# Patient Record
Sex: MALE | Race: BLACK OR AFRICAN AMERICAN
[De-identification: names, ages, dates, MRNs, and addresses within clinical notes are randomized per-mention and may not be internally consistent; named-entity substitution may affect disease eponyms.]

---

## 2020-02-23 ENCOUNTER — HOSPITAL ENCOUNTER (INPATIENT)
Dept: HOSPITAL 54 - ER | Age: 50
LOS: 3 days | Discharge: NURSING, REHAB OR LONG-TERM CARE | DRG: 380 | End: 2020-02-26
Attending: NURSE PRACTITIONER | Admitting: NURSE PRACTITIONER
Payer: COMMERCIAL

## 2020-02-23 VITALS — BODY MASS INDEX: 35.94 KG/M2 | HEIGHT: 74 IN | WEIGHT: 280 LBS

## 2020-02-23 VITALS — SYSTOLIC BLOOD PRESSURE: 120 MMHG | DIASTOLIC BLOOD PRESSURE: 73 MMHG

## 2020-02-23 VITALS — DIASTOLIC BLOOD PRESSURE: 73 MMHG | SYSTOLIC BLOOD PRESSURE: 120 MMHG

## 2020-02-23 VITALS — SYSTOLIC BLOOD PRESSURE: 132 MMHG | DIASTOLIC BLOOD PRESSURE: 76 MMHG

## 2020-02-23 VITALS — SYSTOLIC BLOOD PRESSURE: 121 MMHG | DIASTOLIC BLOOD PRESSURE: 71 MMHG

## 2020-02-23 VITALS — SYSTOLIC BLOOD PRESSURE: 117 MMHG | DIASTOLIC BLOOD PRESSURE: 71 MMHG

## 2020-02-23 VITALS — SYSTOLIC BLOOD PRESSURE: 111 MMHG | DIASTOLIC BLOOD PRESSURE: 76 MMHG

## 2020-02-23 VITALS — DIASTOLIC BLOOD PRESSURE: 76 MMHG | SYSTOLIC BLOOD PRESSURE: 132 MMHG

## 2020-02-23 DIAGNOSIS — L89.154: ICD-10-CM

## 2020-02-23 DIAGNOSIS — E11.40: ICD-10-CM

## 2020-02-23 DIAGNOSIS — E11.69: ICD-10-CM

## 2020-02-23 DIAGNOSIS — L97.519: ICD-10-CM

## 2020-02-23 DIAGNOSIS — E78.5: ICD-10-CM

## 2020-02-23 DIAGNOSIS — E11.621: Primary | ICD-10-CM

## 2020-02-23 DIAGNOSIS — L97.419: ICD-10-CM

## 2020-02-23 DIAGNOSIS — D68.69: ICD-10-CM

## 2020-02-23 DIAGNOSIS — Z74.01: ICD-10-CM

## 2020-02-23 DIAGNOSIS — E66.01: ICD-10-CM

## 2020-02-23 DIAGNOSIS — G82.50: ICD-10-CM

## 2020-02-23 DIAGNOSIS — I10: ICD-10-CM

## 2020-02-23 DIAGNOSIS — K92.2: ICD-10-CM

## 2020-02-23 DIAGNOSIS — Z89.512: ICD-10-CM

## 2020-02-23 DIAGNOSIS — D63.8: ICD-10-CM

## 2020-02-23 DIAGNOSIS — D62: ICD-10-CM

## 2020-02-23 DIAGNOSIS — E87.6: ICD-10-CM

## 2020-02-23 DIAGNOSIS — Z79.01: ICD-10-CM

## 2020-02-23 DIAGNOSIS — N40.0: ICD-10-CM

## 2020-02-23 LAB
BASOPHILS # BLD AUTO: 0.1 /CMM (ref 0–0.2)
BASOPHILS NFR BLD AUTO: 1.1 % (ref 0–2)
BUN SERPL-MCNC: 9 MG/DL (ref 7–18)
CALCIUM SERPL-MCNC: 7.5 MG/DL (ref 8.5–10.1)
CHLORIDE SERPL-SCNC: 108 MMOL/L (ref 98–107)
CO2 SERPL-SCNC: 27 MMOL/L (ref 21–32)
CREAT SERPL-MCNC: 0.6 MG/DL (ref 0.6–1.3)
EOSINOPHIL NFR BLD AUTO: 3.2 % (ref 0–6)
EOSINOPHIL NFR BLD MANUAL: 2 % (ref 0–4)
GLUCOSE SERPL-MCNC: 178 MG/DL (ref 74–106)
HCT VFR BLD AUTO: 20 % (ref 39–51)
HGB BLD-MCNC: 6.4 G/DL (ref 13.5–17.5)
LYMPHOCYTES NFR BLD AUTO: 1.6 /CMM (ref 0.8–4.8)
LYMPHOCYTES NFR BLD AUTO: 24.9 % (ref 20–44)
LYMPHOCYTES NFR BLD MANUAL: 21 % (ref 16–48)
MCHC RBC AUTO-ENTMCNC: 32 G/DL (ref 31–36)
MCV RBC AUTO: 84 FL (ref 80–96)
MONOCYTES NFR BLD AUTO: 0.6 /CMM (ref 0.1–1.3)
MONOCYTES NFR BLD AUTO: 9.4 % (ref 2–12)
MONOCYTES NFR BLD MANUAL: 5 % (ref 0–11)
NEUTROPHILS # BLD AUTO: 4 /CMM (ref 1.8–8.9)
NEUTROPHILS NFR BLD AUTO: 61.4 % (ref 43–81)
NEUTS SEG NFR BLD MANUAL: 71 % (ref 42–76)
PLATELET # BLD AUTO: 434 /CMM (ref 150–450)
POTASSIUM SERPL-SCNC: 3.3 MMOL/L (ref 3.5–5.1)
RBC # BLD AUTO: 2.4 MIL/UL (ref 4.5–6)
SODIUM SERPL-SCNC: 142 MMOL/L (ref 136–145)
VARIANT LYMPHS NFR BLD MANUAL: 1 % (ref 0–0)
WBC NRBC COR # BLD AUTO: 6.6 K/UL (ref 4.3–11)

## 2020-02-23 PROCEDURE — A6403 STERILE GAUZE>16 <= 48 SQ IN: HCPCS

## 2020-02-23 PROCEDURE — A6253 ABSORPT DRG > 48 SQ IN W/O B: HCPCS

## 2020-02-23 PROCEDURE — G0378 HOSPITAL OBSERVATION PER HR: HCPCS

## 2020-02-23 RX ADMIN — Medication SCH EACH: at 22:00

## 2020-02-23 RX ADMIN — Medication SCH EACH: at 17:16

## 2020-02-23 RX ADMIN — ATORVASTATIN CALCIUM SCH MG: 40 TABLET, FILM COATED ORAL at 22:20

## 2020-02-23 RX ADMIN — SODIUM CHLORIDE PRN MLS/HR: 9 INJECTION, SOLUTION INTRAVENOUS at 21:21

## 2020-02-23 RX ADMIN — TAMSULOSIN HYDROCHLORIDE SCH MG: 0.4 CAPSULE ORAL at 22:20

## 2020-02-23 RX ADMIN — GABAPENTIN SCH MG: 300 CAPSULE ORAL at 22:20

## 2020-02-23 NOTE — NUR
rn notes



Patient had bowel movement removed from him.  About 6 handful of bowel movement removed.

## 2020-02-23 NOTE — NUR
BIB [facility] C/O BLEEDING DIABETIC FOOT ULCER, pt awake alert, -sob, nad 
noted. placed on monitor, vss, pending er provider eval

## 2020-02-23 NOTE — NUR
rn notes



patient refuses blood sugar check x3.  Explained the benefits of the procedure and still 
refused.

## 2020-02-23 NOTE — NUR
rn notes



Patient states he only wants chicken.  No red meat, or vegetables.  Patient was offered 
fruits with cottage cheese and refused.  Kitchen states that they do not have chicken and 
patient is on reduced salt diet.

## 2020-02-23 NOTE — NUR
MS RN NOTE 



NOTIFIED MD THAT THE PATIENT IS IN PAIN AND DOES NOT WANT TO TAKE PRN MEDICATION; TYLENOL 
650MG Q6 PRN NOR NORO5/325 AND IS REQUESTING NAPROXEN. MD DID NOT GIVE ANY ORDER AND STATED 
PATIENT SHOULD NOT BE TAKING NAPROXEN D/T DX OF ANEMIA. ALSO STATED SHE CAN CHANGE ORDER FOR 
TYLENOL 650MG ORDER TO Q4HR. INFORMED PATIENT AND HE STILL REFUSED NORCO AND TYLENOL. MD 
AWARE, NO NEW ORDERS, WILL CONTINUE TO MONITOR.

## 2020-02-23 NOTE — NUR
MS RN NOTES 



PATIENT REFUSED SCHEDULED 2200 ACCU CHECK. EXPLAINED RISKS AND BENEFITS. CONTINUED TO 
REFUSE. WILL CONTINUE TO MONITOR.

## 2020-02-23 NOTE — NUR
MS RN OPENING NOTE 



RECEIVED PATIENT IN BED. A/OX4. TOLERATING ROOM AIR. RESPIRATIONS ARE EVEN AND UNLABORED. NO 
S/S SOB NOTED. DENIES PAIN AT THIS TIME. IN NO APPARENT DISTRESS. IV ACCESS IN JAME PICC 
RUNNING 1 UNIT PRBC @90ML/HR. GILMORE CATHETER IS PRESENT, DRAINING TO GRAVITY. BED IS LOW AND 
LOCKED, HOB ELEVATED IN SEMI FOWLERS, SIDE RIALS UP X3, BED ALARM ON. CALL LIGHT WITHIN 
REACH. WILL CONTINUE TO MONITOR.

## 2020-02-24 VITALS — DIASTOLIC BLOOD PRESSURE: 94 MMHG | SYSTOLIC BLOOD PRESSURE: 149 MMHG

## 2020-02-24 VITALS — DIASTOLIC BLOOD PRESSURE: 86 MMHG | SYSTOLIC BLOOD PRESSURE: 140 MMHG

## 2020-02-24 VITALS — DIASTOLIC BLOOD PRESSURE: 98 MMHG | SYSTOLIC BLOOD PRESSURE: 143 MMHG

## 2020-02-24 VITALS — DIASTOLIC BLOOD PRESSURE: 89 MMHG | SYSTOLIC BLOOD PRESSURE: 154 MMHG

## 2020-02-24 VITALS — DIASTOLIC BLOOD PRESSURE: 105 MMHG | SYSTOLIC BLOOD PRESSURE: 147 MMHG

## 2020-02-24 VITALS — DIASTOLIC BLOOD PRESSURE: 84 MMHG | SYSTOLIC BLOOD PRESSURE: 132 MMHG

## 2020-02-24 VITALS — SYSTOLIC BLOOD PRESSURE: 129 MMHG | DIASTOLIC BLOOD PRESSURE: 86 MMHG

## 2020-02-24 VITALS — DIASTOLIC BLOOD PRESSURE: 97 MMHG | SYSTOLIC BLOOD PRESSURE: 141 MMHG

## 2020-02-24 VITALS — SYSTOLIC BLOOD PRESSURE: 127 MMHG | DIASTOLIC BLOOD PRESSURE: 90 MMHG

## 2020-02-24 LAB
BASOPHILS # BLD AUTO: 0.1 /CMM (ref 0–0.2)
BASOPHILS # BLD AUTO: 0.1 /CMM (ref 0–0.2)
BASOPHILS NFR BLD AUTO: 1.3 % (ref 0–2)
BASOPHILS NFR BLD AUTO: 1.5 % (ref 0–2)
BUN SERPL-MCNC: 10 MG/DL (ref 7–18)
CALCIUM SERPL-MCNC: 7.7 MG/DL (ref 8.5–10.1)
CHLORIDE SERPL-SCNC: 107 MMOL/L (ref 98–107)
CHOLEST SERPL-MCNC: 76 MG/DL (ref ?–200)
CO2 SERPL-SCNC: 24 MMOL/L (ref 21–32)
CREAT SERPL-MCNC: 0.5 MG/DL (ref 0.6–1.3)
EOSINOPHIL NFR BLD AUTO: 2.7 % (ref 0–6)
EOSINOPHIL NFR BLD AUTO: 4.2 % (ref 0–6)
EOSINOPHIL NFR BLD MANUAL: 1 % (ref 0–4)
GLUCOSE SERPL-MCNC: 169 MG/DL (ref 74–106)
HCT VFR BLD AUTO: 19 % (ref 39–51)
HCT VFR BLD AUTO: 21 % (ref 39–51)
HDLC SERPL-MCNC: 25 MG/DL (ref 40–60)
HGB BLD-MCNC: 6.1 G/DL (ref 13.5–17.5)
HGB BLD-MCNC: 7 G/DL (ref 13.5–17.5)
LDLC SERPL DIRECT ASSAY-MCNC: 43 MG/DL (ref 0–99)
LYMPHOCYTES NFR BLD AUTO: 1.3 /CMM (ref 0.8–4.8)
LYMPHOCYTES NFR BLD AUTO: 1.6 /CMM (ref 0.8–4.8)
LYMPHOCYTES NFR BLD AUTO: 17.8 % (ref 20–44)
LYMPHOCYTES NFR BLD AUTO: 21.9 % (ref 20–44)
LYMPHOCYTES NFR BLD MANUAL: 18 % (ref 16–48)
MAGNESIUM SERPL-MCNC: 1.4 MG/DL (ref 1.8–2.4)
MCHC RBC AUTO-ENTMCNC: 33 G/DL (ref 31–36)
MCHC RBC AUTO-ENTMCNC: 33 G/DL (ref 31–36)
MCV RBC AUTO: 83 FL (ref 80–96)
MCV RBC AUTO: 83 FL (ref 80–96)
MONOCYTES NFR BLD AUTO: 0.8 /CMM (ref 0.1–1.3)
MONOCYTES NFR BLD AUTO: 0.8 /CMM (ref 0.1–1.3)
MONOCYTES NFR BLD AUTO: 10.2 % (ref 2–12)
MONOCYTES NFR BLD AUTO: 10.7 % (ref 2–12)
MONOCYTES NFR BLD MANUAL: 9 % (ref 0–11)
NEUTROPHILS # BLD AUTO: 4.4 /CMM (ref 1.8–8.9)
NEUTROPHILS # BLD AUTO: 5.1 /CMM (ref 1.8–8.9)
NEUTROPHILS NFR BLD AUTO: 61.7 % (ref 43–81)
NEUTROPHILS NFR BLD AUTO: 68 % (ref 43–81)
NEUTS SEG NFR BLD MANUAL: 72 % (ref 42–76)
PHOSPHATE SERPL-MCNC: 3.6 MG/DL (ref 2.5–4.9)
PLATELET # BLD AUTO: 374 /CMM (ref 150–450)
PLATELET # BLD AUTO: 382 /CMM (ref 150–450)
POTASSIUM SERPL-SCNC: 3.6 MMOL/L (ref 3.5–5.1)
RBC # BLD AUTO: 2.24 MIL/UL (ref 4.5–6)
RBC # BLD AUTO: 2.55 MIL/UL (ref 4.5–6)
SODIUM SERPL-SCNC: 142 MMOL/L (ref 136–145)
TRIGL SERPL-MCNC: 49 MG/DL (ref 30–150)
WBC NRBC COR # BLD AUTO: 7.2 K/UL (ref 4.3–11)
WBC NRBC COR # BLD AUTO: 7.5 K/UL (ref 4.3–11)

## 2020-02-24 PROCEDURE — 0JBN0ZZ EXCISION OF RIGHT LOWER LEG SUBCUTANEOUS TISSUE AND FASCIA, OPEN APPROACH: ICD-10-PCS | Performed by: STUDENT IN AN ORGANIZED HEALTH CARE EDUCATION/TRAINING PROGRAM

## 2020-02-24 PROCEDURE — 30233N1 TRANSFUSION OF NONAUTOLOGOUS RED BLOOD CELLS INTO PERIPHERAL VEIN, PERCUTANEOUS APPROACH: ICD-10-PCS | Performed by: NURSE PRACTITIONER

## 2020-02-24 RX ADMIN — MAGNESIUM SULFATE IN DEXTROSE SCH MLS/HR: 10 INJECTION, SOLUTION INTRAVENOUS at 10:21

## 2020-02-24 RX ADMIN — METOPROLOL SUCCINATE SCH MG: 25 TABLET, EXTENDED RELEASE ORAL at 09:30

## 2020-02-24 RX ADMIN — Medication SCH EACH: at 07:30

## 2020-02-24 RX ADMIN — MAGNESIUM SULFATE IN DEXTROSE SCH MLS/HR: 10 INJECTION, SOLUTION INTRAVENOUS at 18:43

## 2020-02-24 RX ADMIN — Medication SCH EACH: at 21:13

## 2020-02-24 RX ADMIN — MAGNESIUM SULFATE IN DEXTROSE SCH MLS/HR: 10 INJECTION, SOLUTION INTRAVENOUS at 20:07

## 2020-02-24 RX ADMIN — MAGNESIUM SULFATE IN DEXTROSE SCH MLS/HR: 10 INJECTION, SOLUTION INTRAVENOUS at 17:16

## 2020-02-24 RX ADMIN — Medication SCH EACH: at 12:00

## 2020-02-24 RX ADMIN — Medication SCH EACH: at 17:26

## 2020-02-24 RX ADMIN — Medication SCH OZ: at 09:30

## 2020-02-24 RX ADMIN — ATORVASTATIN CALCIUM SCH MG: 40 TABLET, FILM COATED ORAL at 21:12

## 2020-02-24 RX ADMIN — PANTOPRAZOLE SODIUM SCH MG: 40 TABLET, DELAYED RELEASE ORAL at 08:37

## 2020-02-24 RX ADMIN — GABAPENTIN SCH MG: 300 CAPSULE ORAL at 21:12

## 2020-02-24 RX ADMIN — GABAPENTIN SCH MG: 300 CAPSULE ORAL at 09:30

## 2020-02-24 RX ADMIN — SODIUM CHLORIDE PRN MLS/HR: 9 INJECTION, SOLUTION INTRAVENOUS at 08:34

## 2020-02-24 RX ADMIN — TAMSULOSIN HYDROCHLORIDE SCH MG: 0.4 CAPSULE ORAL at 21:12

## 2020-02-24 NOTE — NUR
MS RN REFUSAL NOTES

Informed patient that vitals need to be taken PRIOR to blood transfusion. Patient agreed to 
have transfusion BUT did not answer regarding allowing to obtain vitals. Will continue to 
attempt to obtain V/S as soon as blood is picked up. Will  blood as soon as 1 bag of 
magnesium is transfused.

## 2020-02-24 NOTE — NUR
MS RN CLOSING NOTE 



PATIENT IN BED. A/OX4. TOLERATING ROOM AIR. RESPIRATIONS ARE EVEN AND UNLABORED. NO SOB 
NOTED. C/O PAIN BUT REFUSED PAIN MEDICATIONS MD PRESCRIBED. NO DISTRESS NOTED. IV ACCESS 
MAINTAINED IN [facility] PICC RUNNING NS @75ML/HR. GILMORE CATHETER IS MAINTAINED, DRAINING TO 
GRAVITY, URINE OUTPUT 550. BED IS LOW AND LOCKED, HOB ELEVATED IN SEMI FOWLERS, SIDE RIALS 
UP X3, BED ALARM ON. CALL LIGHT WITHIN REACH. WILL ENDORSE TO DAY SHIFT.

## 2020-02-24 NOTE — NUR
MS RN INITIAL NOTES

Report received at bedside. Patient received in bed, awake, alert and oriented x4, verbally 
responsive. IV access in place: patent and intact with IVF running - pt tolerating well. 
Safety measures in place. Will continue to monitor and assess patient.

## 2020-02-24 NOTE — NUR
MS RN - BLOOD TRANSFUSION NOTES

Pre-vital 143/99  99  98.5   18  100%

Awake, alert and oriented x4, verbally responsive. Not in any type of distress noted. No 
SOB/labored breathing noted. Will continue to monitor and assess patient at bedside. 
Reinforced teachings and risks of adverse effects - patient verbalized understanding. 

1 unit of PRBC checked/assessed at bedside along with another RN: Joshua GUERRA

## 2020-02-24 NOTE — NUR
MS RN CLOSING NOTES

Patient remained in bed, awake, well, verbally responsive. Alert and oriented x4 with 
epsiodes of confusion with care providers. Patient had episodes of care refusal with 
accucheck, wound tx/special mattress, turn/reposition and meds. Have episodes of aggressive 
and sarcastic behavior at certain staff. No SOB/labored breathing noted or reported. Not in 
any type of distress. Provided assistance needed. Patient agreed to continue with foot care 
plan and treatment and blood transfusion. No discharge order/plan as of this moment. Another 
order of 1PRBC to adminster ordered by Ms. Rojo. Sacral wound-vac remains in place. Wound 
dressing and treatment done x3. Safety measures in place. Bed in lowest position with bed 
alarm on and call light within reach. Will continue to monitor and assess patient. Will 
endorse to oncchristian shift nurse. 


-------------------------------------------------------------------------------

Addendum: 02/24/20 at 1939 by HARESH GOMES RN

-------------------------------------------------------------------------------

Endorsed to RN

## 2020-02-24 NOTE — NUR
MS RN NOTE 



RECEIVED CRITICAL LAB  FROM [facility] FOR HEMOGLOBIN. 6.1. AND HEMATOCRIT 19. READ BACK, NOTED, 
WILL NOTIFY MD AND NEXT SHIFT RN.

## 2020-02-24 NOTE — NUR
MS RN NOTE



ACCUCHECK 0730 BLOOD SUGAR CHECKED 163. REFUSED INSULIN COVERAGE. EXPLAINED RISKS AND 
BENEFITS, CONTINUES TO REFUSE.

## 2020-02-24 NOTE — NUR
RN NOTES



PATIENT REFUSED TO HAVE ACCU-CHECK DONE X3. RISK AND BENEFITS EXPLAINED. PATIENT VERBALIZED 
UNDERSTANDING. WILL CONTINUE TO MONITOR.

## 2020-02-24 NOTE — NUR
RN NOTES





PATIENT REFUSED VITALS X3. STATES HE WILL ONLY HAVE THEM DONE WHILE RECEIVING BLOOD 
TRANSFUSION. RISK AND BENEFITS EXPLAINED. PATIENT VERBALIZED UNDERSTANDING. WILL CONTINUE TO 
MONITOR.

## 2020-02-24 NOTE — NUR
MS RN REFUSAL NOTES

Patient refused to have vitals signs taken by me and/or by CNA. Unable to administer BP 
medications w/o BP assessment. Also, patient refused to take gabapentin and demanded to 
leave medications on table and will be taken later. Clarified with patient that we CANNOT 
leave medication/unwitnessed adminstration. Patient became furious and stated that he has 
NEVER heard of that "dumb excuse" before. Explained risks vs benefits but patient continues 
to refuse.

## 2020-02-24 NOTE — NUR
MS RN INITIAL NOTES

Clinical trial Report received at bedside. Patient received in bed, awake, alert and 
oriented x4, verbally responsive. Safety measures in place. Will continue to monitor and 
assess patient.

## 2020-02-24 NOTE — NUR
MS RN - WOUND VAC NOTES

Wound-vac alarmed with notification that canister is full or tubing is kinked. Assessed 
tubing - not kinked. Patient refused to switched to our wound-vac. Notified charge nurse. 
Called [facility] ([facility] [phone]) to deliver supplies for personal wound-vac. 
Will endorse to yunior stone nurse

-------------------------------------------------------------------------------

Addendum: 02/24/20 at 1938 by HARESH GOMES RN

-------------------------------------------------------------------------------

Endorsed to RN

## 2020-02-24 NOTE — NUR
RN OPEN NOTES



RECEIVED PATIENT AWAKE IN BED. A/OX4. NO SIGNS OF DISTRESS OR DISCOMFORT. BREATHING EVEN AND 
UNLABORED. HAS JAME PICC WITH NS INFUSING, PATENT AND INTACT, NO SIGNS OF REDNESS OR 
INFILTRATION. HAS F/C INTACT, DRAINING CLEAR DORIS FLUID. DRESSING ON RLE C/D.I. HAS WOUND 
VAC TO SACRUM INTACT, REFUSING TO HAVE VAC OR WOUND PROPERLY ASSESSED. BED IN LOW LOCKED 
POSITION WITH SIDE RAILS X2. CALL LIGHT WITHIN REACH. WILL CONTINUE TO MONITOR.

## 2020-02-24 NOTE — NUR
MS RN REFUSAL NOTES

Patient refused to have BLOOD SUGAR taken. Explained risks vs benefits but continues to 
refuse.  Also refused to have breakfast. Per patient, "it is NOT what I wanted!" Patient 
also verbalized that he would like to be discharged ASAP. Explained risks vs benefits 
especially with low hgb, patient still persistent to be discharged today. Will relay message 
to MD

## 2020-02-24 NOTE — NUR
MS RN - BLOOD TRANSFUSION NOTES

Transfusion completed. 1 PRBC of 328cc has been transfused with no adverse reaction noted or 
reported. Patient remained awake, alert & oriented x4, verbally responsive with no 
complaints of any pain/rash/chills/hot flush. Vitals signs obtained. Will continue to 
monitor and assess patient.

## 2020-02-24 NOTE — NUR
MS RN - BLOOD TRANSFUSION NOTES

Called Lab and spoke with Bryan to get an update with blood unit availability. Order will get 
processed asap. Endorsed to RN

## 2020-02-24 NOTE — NUR
MS RN REFUSAL NOTES

Notified patient of the special mattress for his sacral wound vac. Patient refused mattress. 
Explained risks vs benefits x3 but patient continues to refuse. Patient expects to be 
discharged tomorrow and believes that nothing will happen overnight if special mattress is 
not in place. Explained that there's no confirmation of discharge tomorrow and it makes a 
lot of difference to have special mattress for patient with sacral wound on diabetic 
patients. Patient continues to refuse. Will endorse to oncoming shift nurse and will attempt 
to convince patient

## 2020-02-24 NOTE — NUR
MS RN - BLOOD TRANSFUSION NOTES

15 minute v/s: 141/97   105  98.5  20   100% room air

No rash/back pain/hot flush/reaction noted or reported. Patient remains A&Ox4, verbally 
responsive. No SOB/labored breathing noted. Will continue to monitor and assess patient. At 
bedside. 

-------------------------------------------------------------------------------

Addendum: 02/24/20 at 1215 by HARESH GOMES RN

-------------------------------------------------------------------------------

1201pm

30 minute V/S: 154/89  103  20  98.3  100% room air

No complaints of any reaction to blood transfusion. Will continue to monitor and assess 
patient. Podiatrist at bedside. Consent obtained for RLE debridement - signed by patient and 
physician. Debridement and dressing change being done currently.

-------------------------------------------------------------------------------

Addendum: 02/24/20 at 1231 by HARESH GOMES RN

-------------------------------------------------------------------------------

1231

1hr post-transfusion VS: 140/86  107  20  98.2  100% room air

## 2020-02-24 NOTE — NUR
WOUND CARE CONSULT: PT PRESENTS WITH WOUNDS TO RT FOOT, HEEL AND RT LATERAL LOWER LEG, 
PRESENT ON ADMISSION. LEFT BELOW KNEE AMPUTATION STUMP NOTED. PT HAS KCI FREEDOM VAC TO 
SACRUM BUT REFUSED ASSESSMENT. FREEDOM VAC HAS  PLUGGED INTO WALL OUTLET AND IS 
FUNCTIONING AT 125mmHg WITH MODERATE AMOUNT OF SEROSANGUINOUS DRAINAGE IN CANISTER. PT 
REFUSED TO TURN FOR FULL SKIN ASSESSMENT. RECOMMEND DPM AND SURGICAL CONSULTS. DR RIOS 
AND DR MARQUEZ NOTIFIED OF CONSULT REQUESTS. FIRST STEP LOW AIRLOSS MATTRESS ORDERED. 
CURRENT BIJAL SCORE IS 12. GILMORE CATH NOTED. PT IS ANGRY AND UNCOOPERATIVE AT TIMES. WILL 
SEE PRN. MD IN AGREEMENT WITH PLAN OF CARE. 

-------------------------------------------------------------------------------

Addendum: 02/24/20 at 0850 by PATRICK GARCIA WNDNU

-------------------------------------------------------------------------------

Amended: Links added.

## 2020-02-25 VITALS — DIASTOLIC BLOOD PRESSURE: 62 MMHG | SYSTOLIC BLOOD PRESSURE: 101 MMHG

## 2020-02-25 VITALS — DIASTOLIC BLOOD PRESSURE: 67 MMHG | SYSTOLIC BLOOD PRESSURE: 112 MMHG

## 2020-02-25 VITALS — DIASTOLIC BLOOD PRESSURE: 75 MMHG | SYSTOLIC BLOOD PRESSURE: 121 MMHG

## 2020-02-25 VITALS — DIASTOLIC BLOOD PRESSURE: 72 MMHG | SYSTOLIC BLOOD PRESSURE: 109 MMHG

## 2020-02-25 VITALS — SYSTOLIC BLOOD PRESSURE: 110 MMHG | DIASTOLIC BLOOD PRESSURE: 75 MMHG

## 2020-02-25 VITALS — DIASTOLIC BLOOD PRESSURE: 83 MMHG | SYSTOLIC BLOOD PRESSURE: 140 MMHG

## 2020-02-25 VITALS — SYSTOLIC BLOOD PRESSURE: 113 MMHG | DIASTOLIC BLOOD PRESSURE: 65 MMHG

## 2020-02-25 VITALS — DIASTOLIC BLOOD PRESSURE: 89 MMHG | SYSTOLIC BLOOD PRESSURE: 145 MMHG

## 2020-02-25 LAB
BASOPHILS # BLD AUTO: 0.1 /CMM (ref 0–0.2)
BASOPHILS NFR BLD AUTO: 1.3 % (ref 0–2)
BUN SERPL-MCNC: 8 MG/DL (ref 7–18)
CALCIUM SERPL-MCNC: 7.7 MG/DL (ref 8.5–10.1)
CHLORIDE SERPL-SCNC: 106 MMOL/L (ref 98–107)
CO2 SERPL-SCNC: 26 MMOL/L (ref 21–32)
CREAT SERPL-MCNC: 0.5 MG/DL (ref 0.6–1.3)
EOSINOPHIL NFR BLD AUTO: 4.5 % (ref 0–6)
GLUCOSE SERPL-MCNC: 169 MG/DL (ref 74–106)
HCT VFR BLD AUTO: 21 % (ref 39–51)
HCT VFR BLD AUTO: 24 % (ref 39–51)
HGB BLD-MCNC: 6.8 G/DL (ref 13.5–17.5)
HGB BLD-MCNC: 7.8 G/DL (ref 13.5–17.5)
LYMPHOCYTES NFR BLD AUTO: 1.2 /CMM (ref 0.8–4.8)
LYMPHOCYTES NFR BLD AUTO: 15.8 % (ref 20–44)
MAGNESIUM SERPL-MCNC: 1.7 MG/DL (ref 1.8–2.4)
MCHC RBC AUTO-ENTMCNC: 32 G/DL (ref 31–36)
MCV RBC AUTO: 85 FL (ref 80–96)
MONOCYTES NFR BLD AUTO: 0.7 /CMM (ref 0.1–1.3)
MONOCYTES NFR BLD AUTO: 10 % (ref 2–12)
NEUTROPHILS # BLD AUTO: 5 /CMM (ref 1.8–8.9)
NEUTROPHILS NFR BLD AUTO: 68.4 % (ref 43–81)
PLATELET # BLD AUTO: 354 /CMM (ref 150–450)
POTASSIUM SERPL-SCNC: 3.5 MMOL/L (ref 3.5–5.1)
RBC # BLD AUTO: 2.88 MIL/UL (ref 4.5–6)
SODIUM SERPL-SCNC: 140 MMOL/L (ref 136–145)
WBC NRBC COR # BLD AUTO: 7.3 K/UL (ref 4.3–11)

## 2020-02-25 RX ADMIN — Medication PRN EACH: at 09:55

## 2020-02-25 RX ADMIN — Medication SCH EACH: at 07:30

## 2020-02-25 RX ADMIN — GABAPENTIN SCH MG: 300 CAPSULE ORAL at 21:33

## 2020-02-25 RX ADMIN — GABAPENTIN SCH MG: 300 CAPSULE ORAL at 09:31

## 2020-02-25 RX ADMIN — METOPROLOL SUCCINATE SCH MG: 25 TABLET, EXTENDED RELEASE ORAL at 09:38

## 2020-02-25 RX ADMIN — Medication PRN EACH: at 14:25

## 2020-02-25 RX ADMIN — Medication SCH EACH: at 17:30

## 2020-02-25 RX ADMIN — Medication SCH OZ: at 09:47

## 2020-02-25 RX ADMIN — MAGNESIUM SULFATE IN DEXTROSE SCH MLS/HR: 10 INJECTION, SOLUTION INTRAVENOUS at 12:30

## 2020-02-25 RX ADMIN — Medication SCH EACH: at 12:00

## 2020-02-25 RX ADMIN — SODIUM HYPOCHLORITE SCH ML: 1.25 SOLUTION TOPICAL at 09:56

## 2020-02-25 RX ADMIN — ATORVASTATIN CALCIUM SCH MG: 40 TABLET, FILM COATED ORAL at 21:33

## 2020-02-25 RX ADMIN — INSULIN HUMAN PRN UNIT: 100 INJECTION, SOLUTION PARENTERAL at 09:42

## 2020-02-25 RX ADMIN — TAMSULOSIN HYDROCHLORIDE SCH MG: 0.4 CAPSULE ORAL at 21:33

## 2020-02-25 RX ADMIN — MAGNESIUM SULFATE IN DEXTROSE SCH MLS/HR: 10 INJECTION, SOLUTION INTRAVENOUS at 14:07

## 2020-02-25 RX ADMIN — PANTOPRAZOLE SODIUM SCH MG: 40 TABLET, DELAYED RELEASE ORAL at 09:31

## 2020-02-25 RX ADMIN — Medication SCH EACH: at 21:42

## 2020-02-25 NOTE — NUR
RN CLOSING NOTES



PATIENT RESTING IN BED, EASILY AROUSABLE. A/OX4. NO SIGNS OF DISTRESS OR DISCOMFORT. 
BREATHING EVEN AND UNLABORED. HAS JAME PICC WITH NS INFUSING, PATENT AND INTACT, NO SIGNS OF 
REDNESS OR INFILTRATION. HAS F/C INTACT, DRAINING CLEAR DORIS FLUID. DRESSING ON RLE C/D.I. 
HAS WOUND VAC TO SACRUM INTACT, STILL REFUSING TO HAVE VAC OR WOUND PROPERLY ASSESSED. 
THROUGHOUT SHIFT PATIENT REFUSED TO HAVE ACCU-CHECK, WOUND TX AND TURN AND REPOSITIONING 
Q2H, RISK AND BENEFITS WERE THOROUGHLY EXPLAINED AND PATIENT VERBALIZED UNDERSTANDING. NO 
SIGNIFICANT CHANGES THROUGH THE NIGHT. BED IN LOW LOCKED POSITION WITH SIDE RAILS X2. CALL 
LIGHT WITHIN REACH. WILL ENDORSE TO AM SHIFT FOR FCO.

## 2020-02-25 NOTE — NUR
WOUND CARE CONSULT: PT SEEN FOR SACRAL STAGE 4 ULCER, PRESENT ON ADMISSION. PT HAS HIS OWN 
PORTABLE FREEDOM KCI VAC WHICH MALFUNCTIONED AND WAS REMOVED. 100cc RED DRAINAGE IN 
CANISTER. RECOMMENDATIONS MADE FOR SKIN PROTECTION AND WOUND CARE. DISCUSSED WITH NURSING 
STAFF AND MSG LEFT FOR SURGICAL BEENA MCCAULEY. PT REFUSED LOW AIR LOSS MATTRESS EVEN 
AFTER BENEFITS EXPLAINED. WILL SEE PABLO COYNE IN AGREEMENT WITH PLAN OF CARE. 

-------------------------------------------------------------------------------

Addendum: 02/25/20 at 0846 by PATRICK GARCIA WNDNU

-------------------------------------------------------------------------------

Amended: Links added.

## 2020-02-25 NOTE — NUR
MS RN OPENING NOTES

Received Patient awake and resting in bed. A/O x 4. VS stable with no acute distress. 
Breathing even and unlabored on room air with no respiratory distress. Patient stated 
generalized pain on a scale of 7-8/10. Will intervene as ordered. [place] Cath in place and 
patent. JAME PICC line clean, intact, patent and flushing well. Patient refusing IVF at this 
time. Explained risks and benefits. Patient still refused. Removed wound vac and rendered 
wound care on sacrum with wound care nurse present. Patient tolerated well. Will continue to 
monitor. Safety precautions in place. Bed locked and set to lowest position with side rails 
x 2 up. All needs rendered at this time. Call light within reach. Will continue to monitor.

## 2020-02-25 NOTE — NUR
MS RN CLOSING NOTES

Patient awake and resting in bed. A/O x 4. VS stable with no acute distress. Breathing even 
and unlabored on room air with no respiratory distress. Denies pain. No signs and symptoms 
of pain. [place]a Cath in place and patent. JAME PICC line clean, intact, patent and flushing 
well. Safety precautions in place. Bed locked and set to lowest position with side rails x 2 
up. All needs rendered at this time. Call light within reach. Will endorse plan of care to 
oncoming shift.

## 2020-02-25 NOTE — NUR
RN OPEN NOTES



RECEIVED PATIENT AWAKE IN BED. A/OX4. NO SIGNS OF DISTRESS OR DISCOMFORT. BREATHING EVEN AND 
UNLABORED. HAS JAME PICC, PATENT AND INTACT, NO SIGNS OF REDNESS OR INFILTRATION. HAS F/C 
INTACT, DRAINING CLEAR DORIS FLUID. DRESSING ON RLE C/D/I.  BED IN LOW LOCKED POSITION WITH 
SIDE RAILS X2. CALL LIGHT WITHIN REACH. WILL CONTINUE TO MONITOR.

## 2020-02-26 VITALS — SYSTOLIC BLOOD PRESSURE: 145 MMHG | DIASTOLIC BLOOD PRESSURE: 106 MMHG

## 2020-02-26 VITALS — SYSTOLIC BLOOD PRESSURE: 114 MMHG | DIASTOLIC BLOOD PRESSURE: 63 MMHG

## 2020-02-26 LAB
BASOPHILS # BLD AUTO: 0.1 /CMM (ref 0–0.2)
BASOPHILS NFR BLD AUTO: 1.2 % (ref 0–2)
BUN SERPL-MCNC: 7 MG/DL (ref 7–18)
CALCIUM SERPL-MCNC: 7.5 MG/DL (ref 8.5–10.1)
CHLORIDE SERPL-SCNC: 106 MMOL/L (ref 98–107)
CO2 SERPL-SCNC: 27 MMOL/L (ref 21–32)
CREAT SERPL-MCNC: 0.7 MG/DL (ref 0.6–1.3)
EOSINOPHIL NFR BLD AUTO: 3.5 % (ref 0–6)
GLUCOSE SERPL-MCNC: 173 MG/DL (ref 74–106)
HCT VFR BLD AUTO: 21 % (ref 39–51)
HCT VFR BLD AUTO: 23 % (ref 39–51)
HGB BLD-MCNC: 7 G/DL (ref 13.5–17.5)
HGB BLD-MCNC: 7.5 G/DL (ref 13.5–17.5)
LYMPHOCYTES NFR BLD AUTO: 1.4 /CMM (ref 0.8–4.8)
LYMPHOCYTES NFR BLD AUTO: 16.2 % (ref 20–44)
MAGNESIUM SERPL-MCNC: 1.7 MG/DL (ref 1.8–2.4)
MCHC RBC AUTO-ENTMCNC: 33 G/DL (ref 31–36)
MCV RBC AUTO: 84 FL (ref 80–96)
MONOCYTES NFR BLD AUTO: 0.9 /CMM (ref 0.1–1.3)
MONOCYTES NFR BLD AUTO: 10.5 % (ref 2–12)
NEUTROPHILS # BLD AUTO: 6 /CMM (ref 1.8–8.9)
NEUTROPHILS NFR BLD AUTO: 68.6 % (ref 43–81)
PLATELET # BLD AUTO: 379 /CMM (ref 150–450)
PLATELET # BLD AUTO: 401 /CMM (ref 150–450)
POTASSIUM SERPL-SCNC: 3.7 MMOL/L (ref 3.5–5.1)
RBC # BLD AUTO: 2.68 MIL/UL (ref 4.5–6)
SODIUM SERPL-SCNC: 139 MMOL/L (ref 136–145)
WBC NRBC COR # BLD AUTO: 8.8 K/UL (ref 4.3–11)

## 2020-02-26 RX ADMIN — METOPROLOL SUCCINATE SCH MG: 25 TABLET, EXTENDED RELEASE ORAL at 08:27

## 2020-02-26 RX ADMIN — MAGNESIUM SULFATE IN DEXTROSE SCH MLS/HR: 10 INJECTION, SOLUTION INTRAVENOUS at 11:06

## 2020-02-26 RX ADMIN — Medication SCH EACH: at 07:35

## 2020-02-26 RX ADMIN — Medication SCH EACH: at 16:41

## 2020-02-26 RX ADMIN — MAGNESIUM SULFATE IN DEXTROSE SCH MLS/HR: 10 INJECTION, SOLUTION INTRAVENOUS at 09:46

## 2020-02-26 RX ADMIN — GABAPENTIN SCH MG: 300 CAPSULE ORAL at 08:26

## 2020-02-26 RX ADMIN — Medication SCH OZ: at 08:27

## 2020-02-26 RX ADMIN — PANTOPRAZOLE SODIUM SCH MG: 40 TABLET, DELAYED RELEASE ORAL at 08:26

## 2020-02-26 RX ADMIN — Medication PRN EACH: at 05:31

## 2020-02-26 RX ADMIN — Medication SCH EACH: at 12:00

## 2020-02-26 RX ADMIN — INSULIN HUMAN PRN UNIT: 100 INJECTION, SOLUTION PARENTERAL at 07:35

## 2020-02-26 RX ADMIN — SODIUM HYPOCHLORITE SCH ML: 1.25 SOLUTION TOPICAL at 08:27

## 2020-02-26 NOTE — NUR
RN DISCHARGE NOTES



PATIENT DISCHARGED TO [facility] IN STABLE CONDITION VIA EMT. A/OX4. NO SIGNS OF DISTRESS OR 
DISCOMFORT. BREATHING EVEN AND UNLABORED. HAS JAME PICC, PATENT AND INTACT NO SIGNS OF 
REDNESS OR INFILTRATION. HAS F/C INTACT, DRAINING CLEAR DORIS FLUID. DRESSING ON RLE C/D/I. 
PATIENT VERBALIZE UNDERSTANDING OF DISCHARGE INSTRUCTIONS AND EDUCATION. ALL BELONGINGS WITH 
PATIENT. RLE PHOTO TAKEN PATIENT REFUSED OTHER WOUND DOCUMENTATION PHOTOS.

## 2020-02-26 NOTE — NUR
RN OPENING NOTE



PT WAS RECEIVED IN BED AT LOWEST AND LOCKED POSITION WITH SIDE RAILS UPX2, A/O X4 BREATHING 
EVEN AND UNLABORED ON RA WITH NO S/S OF ANY DISTRESS OR PAIN AT THIS TIME, JAME PICC IS 
PATENT AND INTACT, GILMORE IN PLACE AND DRAINING, NOTED TO HAVE A SACRAL AND RIGHT FOOT WOUND 
WITH DRESSING JUST CHANGED ON THE SACRUM, INFORMED BY NIGHT RN THAT REFUSES WOUND CARE AT 
TIMES AND THAT HE ALSO REFUSES INSULIN. PT STATING THAT HE WANTS TO GO HOME. SAFETY 
PRECAUTIONS IN PLACE, CALL LIGHT IN REACH, WILL MONITOR ACCORDINGLY.

## 2020-02-26 NOTE — NUR
RN NOTE



PHOTOS TAKEN OF PT RIGHT FOOT BUT HE REFUSED FOR PHOTOS OF HIS SACRUM AND THE REST OF HIS 
BODY. INFORMED AND EDUCATED ABOUT PURPOSE OF PHOTO DOCUMENTATION.

## 2020-02-26 NOTE — NUR
RN CLOSING NOTES 



PATIENT AWAKE IN BED. A/OX4. NO SIGNS OF DISTRESS OR DISCOMFORT. BREATHING EVEN AND 
UNLABORED. HAS JAME PICC, PATENT AND INTACT, NO SIGNS OF REDNESS OR INFILTRATION. HAS F/C 
INTACT, DRAINING CLEAR DORIS FLUID. DRESSING ON RLE C/D/I.  PATIENT ON KCI MATTRESS. WOUND 
CARE TX DONE. ALL NEEDS MET. NO SIGNIFICANT CHANGES THROUGH THE NIGHT. PATIENT KEPT CLEAN 
DRY AND COMFORTABLE. BED IN LOW LOCKED POSITION WITH SIDE RAILS X2. CALL LIGHT WITHIN REACH. 
ENDORSED TO AM SHIFT FOR FCO.

## 2020-02-26 NOTE — NUR
RN NOTE



PT STATED THAT HE WILL NOT SIGN DISCHARGE UNLESS HE OBTAINS A LETTER OF ADMITTANCE. SOCIAL 
WORKER LOUISE PROVIDED LETTER AND HE WAS SHOWN LETTER OF ADMITTANCE FROM  BUT HE 
REFUSES TO SIGN ANY DISCHARGE PAPERWORK UNLESS IT HAS NO DISCHARGE DATE.  LOUISE 
MADE AWARE, AND STATED SHE WILL GET TO IT LATER ONCE SHE IS DONE WITH OTHER MATTERS. PT 
STATED HE WILL NOT SIGN ANY PAPERWORK UNTIL HE GETS THE LETTER OF ADMITTANCE WITH NO 
DISCHARGE DATE.

## 2020-02-26 NOTE — NUR
RN NOTE



0900 METOPROLOL NOT GIVEN DUE TO PT REFUSING AM VITAL SIGNS, WILL CONTINUE TO MONITOR 
ACCORDINGLY

## 2020-02-26 NOTE — NUR
RN CLOSING NOTE



PT IN BED AT LOWEST AND LOCKED POSITION WITH SIDE RAILS UPX2, A/O X4 BREATHING EVEN AND 
UNLABORED ON RA WITH NO S/S OF ANY DISTRESS OR PAIN AT THIS TIME, JAME PICC IS PATENT AND 
INTACT, GILMORE IN PLACE AND DRAINING, PHOTO OF RIGHT FOOT TAKEN BUT REFUSED PHOTOS OF SACRUM 
AND THE REST OF THE BODY, SAFETY PRECAUTIONS IN PLACE, PLAN FOR DISCHARGE AT 730PM, CALL 
LIGHT IN REACH, ALL NEEDS ATTENDED TO, WILL ENDORSE TO NIGHT RN FOR FCO.

## 2020-02-26 NOTE — NUR
RN OPEN NOTES



RECEIVED PATIENT AWAKE IN BED. A/OX4. NO SIGNS OF DISTRESS OR DISCOMFORT. BREATHING EVEN AND 
UNLABORED. HAS JAME PICC, PATENT AND INTACT, NO SIGNS OF REDNESS OR INFILTRATION. HAS F/C 
INTACT, DRAINING CLEAR DORIS FLUID. DRESSING ON RLE C/D/I.  PATIENT AWAITING DISCHARGE BACK 
TO BOARD AND CARE. BED IN LOW LOCKED POSITION WITH SIDE RAILS X2. CALL LIGHT WITHIN REACH. 
WILL CONTINUE TO MONITOR.